# Patient Record
Sex: FEMALE | Race: OTHER | NOT HISPANIC OR LATINO | URBAN - METROPOLITAN AREA
[De-identification: names, ages, dates, MRNs, and addresses within clinical notes are randomized per-mention and may not be internally consistent; named-entity substitution may affect disease eponyms.]

---

## 2017-05-31 ENCOUNTER — EMERGENCY (EMERGENCY)
Facility: HOSPITAL | Age: 22
LOS: 1 days | Discharge: HOME | End: 2017-05-31
Attending: EMERGENCY MEDICINE | Admitting: EMERGENCY MEDICINE
Payer: MEDICAID

## 2017-05-31 VITALS
SYSTOLIC BLOOD PRESSURE: 128 MMHG | TEMPERATURE: 98 F | HEART RATE: 97 BPM | DIASTOLIC BLOOD PRESSURE: 73 MMHG | OXYGEN SATURATION: 97 % | RESPIRATION RATE: 18 BRPM

## 2017-05-31 DIAGNOSIS — F39 UNSPECIFIED MOOD [AFFECTIVE] DISORDER: ICD-10-CM

## 2017-05-31 DIAGNOSIS — F43.22 ADJUSTMENT DISORDER WITH ANXIETY: ICD-10-CM

## 2017-05-31 LAB
ALBUMIN SERPL ELPH-MCNC: 4.5 G/DL — SIGNIFICANT CHANGE UP (ref 3.3–5)
ALP SERPL-CCNC: 72 U/L — SIGNIFICANT CHANGE UP (ref 40–120)
ALT FLD-CCNC: 63 U/L RC — HIGH (ref 10–45)
ANION GAP SERPL CALC-SCNC: 17 MMOL/L — SIGNIFICANT CHANGE UP (ref 5–17)
APAP SERPL-MCNC: <15 UG/ML — SIGNIFICANT CHANGE UP (ref 10–30)
APTT BLD: 33.8 SEC — SIGNIFICANT CHANGE UP (ref 27.5–37.4)
AST SERPL-CCNC: 51 U/L — HIGH (ref 10–40)
BASOPHILS # BLD AUTO: 0.1 K/UL — SIGNIFICANT CHANGE UP (ref 0–0.2)
BASOPHILS NFR BLD AUTO: 0.8 % — SIGNIFICANT CHANGE UP (ref 0–2)
BILIRUB SERPL-MCNC: 0.5 MG/DL — SIGNIFICANT CHANGE UP (ref 0.2–1.2)
BUN SERPL-MCNC: 8 MG/DL — SIGNIFICANT CHANGE UP (ref 7–23)
CALCIUM SERPL-MCNC: 9.3 MG/DL — SIGNIFICANT CHANGE UP (ref 8.4–10.5)
CHLORIDE SERPL-SCNC: 102 MMOL/L — SIGNIFICANT CHANGE UP (ref 96–108)
CO2 SERPL-SCNC: 22 MMOL/L — SIGNIFICANT CHANGE UP (ref 22–31)
CREAT SERPL-MCNC: 0.57 MG/DL — SIGNIFICANT CHANGE UP (ref 0.5–1.3)
EOSINOPHIL # BLD AUTO: 0.1 K/UL — SIGNIFICANT CHANGE UP (ref 0–0.5)
EOSINOPHIL NFR BLD AUTO: 0.7 % — SIGNIFICANT CHANGE UP (ref 0–6)
ETHANOL SERPL-MCNC: 29 MG/DL — HIGH (ref 0–10)
GLUCOSE SERPL-MCNC: 92 MG/DL — SIGNIFICANT CHANGE UP (ref 70–99)
HCT VFR BLD CALC: 40.9 % — SIGNIFICANT CHANGE UP (ref 34.5–45)
HGB BLD-MCNC: 13.5 G/DL — SIGNIFICANT CHANGE UP (ref 11.5–15.5)
INR BLD: 1.05 RATIO — SIGNIFICANT CHANGE UP (ref 0.88–1.16)
LYMPHOCYTES # BLD AUTO: 2.6 K/UL — SIGNIFICANT CHANGE UP (ref 1–3.3)
LYMPHOCYTES # BLD AUTO: 21 % — SIGNIFICANT CHANGE UP (ref 13–44)
MCHC RBC-ENTMCNC: 28.3 PG — SIGNIFICANT CHANGE UP (ref 27–34)
MCHC RBC-ENTMCNC: 33 GM/DL — SIGNIFICANT CHANGE UP (ref 32–36)
MCV RBC AUTO: 85.7 FL — SIGNIFICANT CHANGE UP (ref 80–100)
MONOCYTES # BLD AUTO: 0.7 K/UL — SIGNIFICANT CHANGE UP (ref 0–0.9)
MONOCYTES NFR BLD AUTO: 5.6 % — SIGNIFICANT CHANGE UP (ref 2–14)
NEUTROPHILS # BLD AUTO: 9 K/UL — HIGH (ref 1.8–7.4)
NEUTROPHILS NFR BLD AUTO: 72 % — SIGNIFICANT CHANGE UP (ref 43–77)
PLATELET # BLD AUTO: 295 K/UL — SIGNIFICANT CHANGE UP (ref 150–400)
POTASSIUM SERPL-MCNC: 4.3 MMOL/L — SIGNIFICANT CHANGE UP (ref 3.5–5.3)
POTASSIUM SERPL-SCNC: 4.3 MMOL/L — SIGNIFICANT CHANGE UP (ref 3.5–5.3)
PROT SERPL-MCNC: 8 G/DL — SIGNIFICANT CHANGE UP (ref 6–8.3)
PROTHROM AB SERPL-ACNC: 11.4 SEC — SIGNIFICANT CHANGE UP (ref 9.8–12.7)
RBC # BLD: 4.77 M/UL — SIGNIFICANT CHANGE UP (ref 3.8–5.2)
RBC # FLD: 12.6 % — SIGNIFICANT CHANGE UP (ref 10.3–14.5)
SALICYLATES SERPL-MCNC: <2 MG/DL — LOW (ref 15–30)
SODIUM SERPL-SCNC: 141 MMOL/L — SIGNIFICANT CHANGE UP (ref 135–145)
WBC # BLD: 12.5 K/UL — HIGH (ref 3.8–10.5)
WBC # FLD AUTO: 12.5 K/UL — HIGH (ref 3.8–10.5)

## 2017-05-31 PROCEDURE — 85730 THROMBOPLASTIN TIME PARTIAL: CPT

## 2017-05-31 PROCEDURE — 99284 EMERGENCY DEPT VISIT MOD MDM: CPT

## 2017-05-31 PROCEDURE — 85610 PROTHROMBIN TIME: CPT

## 2017-05-31 PROCEDURE — 85027 COMPLETE CBC AUTOMATED: CPT

## 2017-05-31 PROCEDURE — 80307 DRUG TEST PRSMV CHEM ANLYZR: CPT

## 2017-05-31 PROCEDURE — 80053 COMPREHEN METABOLIC PANEL: CPT

## 2017-05-31 PROCEDURE — 99285 EMERGENCY DEPT VISIT HI MDM: CPT

## 2017-05-31 PROCEDURE — 90792 PSYCH DIAG EVAL W/MED SRVCS: CPT | Mod: GC

## 2017-05-31 NOTE — ED PROVIDER NOTE - OBJECTIVE STATEMENT
20 yo f with history of anxiety, depression presents after cutting her wrist with scissors. The patient reports that she got into an argument with her boyfriend overnight and attempted to cut her wrist with scissors. She has 3 superficial abrasions. She brought herself to the ED. History of prior psych visit for depression, but not on any medication. She reports prior SI, but used pills at that time. Denies other injuries. Tetanus UTD.

## 2017-05-31 NOTE — ED ADULT NURSE NOTE - OBJECTIVE STATEMENT
Pt got into an argument yesterday  and Bf called mom. Mom came and as they were all in the car sitting in front of the house, pt mom excused herself to go back inside the house to rest and left pt and Bf in the car, During this time, pt said that BF started to argue with patient, during this time, pt obtained a scissor from the car (mom is a hairstylist) and prpceeded to superficially cur her left wrist. Pt also has bruises on her legs stated that she did it to her self and she like to be "dramatic". Pt denied current SI/HI/P/I. Pt was placed on 1:1 for safety

## 2017-05-31 NOTE — ED BEHAVIORAL HEALTH ASSESSMENT NOTE - OTHER PAST PSYCHIATRIC HISTORY (INCLUDE DETAILS REGARDING ONSET, COURSE OF ILLNESS, INPATIENT/OUTPATIENT TREATMENT)
Pt saw therapist from ages 12 to 17 Pt saw therapist from ages 12 to 17    1 prior psych admission 2 years ago for SA by OD on pills

## 2017-05-31 NOTE — ED PROVIDER NOTE - MEDICAL DECISION MAKING DETAILS
Attending Omar: 21 y.o female presenting after cutting her wrist. pt was upset after argument with boyfriend. did have some etoh today. currently tearful. on exam small cuts to wrist no evidence of sig laceration. will d/w psych and re-eval

## 2017-05-31 NOTE — ED BEHAVIORAL HEALTH ASSESSMENT NOTE - SUICIDE PROTECTIVE FACTORS
Identifies reasons for living/Responsibility to family and others/Supportive social network or family/Future oriented/Engaged in work or school

## 2017-05-31 NOTE — ED BEHAVIORAL HEALTH ASSESSMENT NOTE - SUMMARY
22 y/o F w/ h/o depressive and anxiety sx's in the past, SA by med OD resulting in inpt psych hospitalization 2 years ago, h/o cutting once in the past in the context of argument w/ boyfriend, today BIB EMS activated by boyfriend after patient superficially cut her wrists with scissors in the context of an argument. Pt reports that this incident was a cry for help. She denies SI/HI. Pt's mother has no concerns about pt's safety and wishes that 911 had not been called.

## 2017-05-31 NOTE — ED BEHAVIORAL HEALTH ASSESSMENT NOTE - HPI (INCLUDE ILLNESS QUALITY, SEVERITY, DURATION, TIMING, CONTEXT, MODIFYING FACTORS, ASSOCIATED SIGNS AND SYMPTOMS)
20 y/o F w/ h/o depressive and anxiety sx's in the past, SA by OD resulting in inpt psych hospitalization 2 years ago, 20 y/o F w/ h/o depressive and anxiety sx's in the past, SA by OD resulting in inpt psych hospitalization 2 years ago, h/o cutting once in the past in the context of argument w/ boyfriend, today BIB EMS activated by boyfriend after patient superficially cut her wrists with scissors in the context of an argumnt. Pt reports that her and her boyfriend of 3 years have had a tumultuous course of late with multiple instances of the patient discovering that her boyfriend was lying about his whereabouts. She reports that yesterday she again recently 22 y/o F w/ h/o depressive and anxiety sx's in the past, SA by med OD resulting in inpt psych hospitalization 2 years ago, h/o cutting once in the past in the context of argument w/ boyfriend, today BIB EMS activated by boyfriend after patient superficially cut her wrists with scissors in the context of an argument. Pt reports that her and her boyfriend of 3 years have had a tumultuous course of late with multiple instances of the patient discovering that her boyfriend was lying about his whereabouts. She reports that yesterday she again found out that he had lied to her and while they were in her mother's car arguing she grabbed a pair of scissors in the car (mom is a hairdresser) and scratched her wrist. She reports that she did this as a "cry for attention" as she is so frustrated with the boyfriend. She denies this cutting behavior being a suicide attempt. Pt endorses eating well, being social with friends on a regular basis, being engaged in her full-time job. She does transient panic symptoms but w/o any regularity. She denies past or present sx's of leah and psychosis. Pt denies current SI/HI.    Pt's mother Vidhya ([phone]) reports that she does not have any concerns for the patient's safety and would like her to come back home ASAP. She does report that her and the pt have not been speaking as much as they used to in the past but attributes that partly to her own busy schedule. She is aware of and in agreement w/ plan for pt to resume outpt psychotherapy. 20 y/o F w/ h/o depressive and anxiety sx's in the past, SA by med OD resulting in inpt psych hospitalization 2 years ago, h/o cutting once in the past in the context of argument w/ boyfriend, today BIB EMS activated by boyfriend after patient superficially cut her wrists with scissors in the context of an argument. Pt reports that her and her boyfriend of 3 years have had a tumultuous course of late with multiple instances of the patient discovering that her boyfriend was lying about his whereabouts. She reports that yesterday she again found out that he had lied to her and while they were in her mother's car arguing she grabbed a pair of scissors in the car (mom is a hairdresser) and scratched her wrist. She reports that she did this as a "cry for attention" as she is so frustrated with the boyfriend. She denies this cutting behavior being a suicide attempt. Pt endorses eating well, being social with friends on a regular basis, being engaged in her full-time job. She does endorse transient panic symptoms but w/o any regularity. She denies past or present sx's of leah and psychosis. Pt vehmently denies current SI/HI.    Pt's mother Vidhya ([phone]) reports that she does not have any concerns for the patient's safety and would like her to come back home ASAP. She does report that her and the pt have not been speaking as much as they used to in the past but attributes that partly to her own busy schedule. She is aware of and in agreement w/ plan for pt to resume outpt psychotherapy. 22 y/o F w/ h/o depressive and anxiety sx's in the past, SA by med OD resulting in inpt psych hospitalization 2 years ago, h/o cutting once in the past in the context of argument w/ boyfriend, today BIB EMS activated by boyfriend after patient superficially cut her wrists with scissors in the context of an argument. Pt reports that her and her boyfriend of 3 years have had a tumultuous course of late with multiple instances of the patient discovering that her boyfriend was lying about his whereabouts. She reports that yesterday she again found out that he had lied to her and while they were in her mother's car arguing she grabbed a pair of scissors in the car (mom is a hairdresser) and scratched her wrist. She reports that she did this as a "cry for attention" as she is so frustrated with the boyfriend. She denies this cutting behavior being a suicide attempt. Pt endorses eating well, being social with friends on a regular basis, being engaged in her full-time job. She does endorse transient panic symptoms but w/o any regularity. She denies past or present sx's of leah and psychosis. Pt vehmently denies current SI/HI.    Pt's mother Vidhya ([phone]) reports that she does not have any concerns for the patient's safety and would like her to come back home ASAP- she feels that 911 should not have been called. She does report that her and the pt have not been speaking as much as they used to in the past but attributes that partly to her own busy schedule. She is aware of and in agreement w/ plan for pt to resume outpt psychotherapy.

## 2017-05-31 NOTE — ED BEHAVIORAL HEALTH ASSESSMENT NOTE - CASE SUMMARY
21F single, domiciled with family, employed as a /, with PPHx mood d/o, 1 prior psych admission for SA by OD while intoxicated, not currently in tx but was in therapy from ages [age], with no h/o substance abuse, no relevant PMH bib EMS after pt made superficial scratches to wrist in front of her boyfriend today.  Pt denies SIIP/HIIP; states she was anxious while fighting with her boyfriend so she made scratches with scissors to get his attention; has cut herself before in the context of anxiety over the years but never with suicidal intent, denies this was a suicide attempt.  Pt c/o chronic mood instability which worsens when she is fighting with her boyfriend; states they had a fight yesterday over him lying about his whereabouts which led to her cutting in front of him.  Denies manic/psychotic sx.  Denies substance abuse.  Collateral obtained from pt’s mother who is aware of pt’s ongoing relationship issues, but denies concerns of harm to self/others.  Denies access to guns.  Pt presents with good grooming and hygiene, has been attending work daily, socializing with friends.  Likely dx mood d/o NOS, r/o cluster B personality traits.  1. Pt does not meet criteria for acute psych admission. 2. Pt to call 911 or return to ER for imminent risk of harm to self/others.  3. Pt should f/u as outpatient- [facility] [phone].

## 2017-05-31 NOTE — ED BEHAVIORAL HEALTH ASSESSMENT NOTE - REFERRAL / APPOINTMENT DETAILS
Pt will f/u w/ need outpt therapist Pt should f/u as outpatient- [facility] [phone]

## 2017-05-31 NOTE — ED BEHAVIORAL HEALTH ASSESSMENT NOTE - DESCRIPTION
calm and cooperative None calm and cooperative throughout ED course Pt resides at home w/ her mother. She graduated high school and has been working full-time as a  +/- since then. She endorses frequent drinking in the context of partying w/ friends but denies illicit substance use.

## 2017-05-31 NOTE — ED BEHAVIORAL HEALTH ASSESSMENT NOTE - DIFFERENTIAL
Adjustment d/o, borderline personality d/o, MDD, JAGDISH mood d/o unspecified, r/o borderline personality d/o, r/o MDD, JAGDISH

## 2017-05-31 NOTE — ED BEHAVIORAL HEALTH ASSESSMENT NOTE - RISK ASSESSMENT
Pt has some limited chronic risks of suicide including h/o poor alliance w/ previous outpt therapist, prior psych admission, prior SA, possible FH suicide, possible substance abuse. She has multiple protective factors including strong social support from mother and friends, stability of domicile, and future orientation. She is at a low to moderate risk of suicide. Pt has some limited chronic risks of suicide including h/o prior psych admission, prior SA, possible FH suicide, possible substance abuse. She has multiple protective factors including strong social support from mother and friends, stability of domicile, engaged in work, no access to guns, and future orientation. She is at a low to moderate risk of suicide.
